# Patient Record
Sex: FEMALE | Race: WHITE | URBAN - METROPOLITAN AREA
[De-identification: names, ages, dates, MRNs, and addresses within clinical notes are randomized per-mention and may not be internally consistent; named-entity substitution may affect disease eponyms.]

---

## 2021-01-21 ENCOUNTER — OFFICE VISIT (OUTPATIENT)
Dept: URBAN - METROPOLITAN AREA CLINIC 72 | Facility: CLINIC | Age: 46
End: 2021-01-21
Payer: OTHER GOVERNMENT

## 2021-01-21 ENCOUNTER — DASHBOARD ENCOUNTERS (OUTPATIENT)
Age: 46
End: 2021-01-21

## 2021-01-21 ENCOUNTER — WEB ENCOUNTER (OUTPATIENT)
Dept: URBAN - METROPOLITAN AREA CLINIC 72 | Facility: CLINIC | Age: 46
End: 2021-01-21

## 2021-01-21 VITALS
WEIGHT: 161 LBS | HEIGHT: 70 IN | HEART RATE: 45 BPM | BODY MASS INDEX: 23.05 KG/M2 | DIASTOLIC BLOOD PRESSURE: 76 MMHG | TEMPERATURE: 97.8 F | SYSTOLIC BLOOD PRESSURE: 126 MMHG

## 2021-01-21 DIAGNOSIS — Z12.11 COLON CANCER SCREENING: ICD-10-CM

## 2021-01-21 DIAGNOSIS — K59.01 SLOW TRANSIT CONSTIPATION: ICD-10-CM

## 2021-01-21 PROBLEM — 35298007: Status: ACTIVE | Noted: 2021-01-21

## 2021-01-21 PROCEDURE — G8483 FLU IMM NO ADMIN DOC REA: HCPCS | Performed by: INTERNAL MEDICINE

## 2021-01-21 PROCEDURE — G8420 CALC BMI NORM PARAMETERS: HCPCS | Performed by: INTERNAL MEDICINE

## 2021-01-21 PROCEDURE — 99203 OFFICE O/P NEW LOW 30 MIN: CPT | Performed by: INTERNAL MEDICINE

## 2021-01-21 PROCEDURE — 1036F TOBACCO NON-USER: CPT | Performed by: INTERNAL MEDICINE

## 2021-01-21 PROCEDURE — G8427 DOCREV CUR MEDS BY ELIG CLIN: HCPCS | Performed by: INTERNAL MEDICINE

## 2021-01-21 RX ORDER — SACCHAROMYCES BOULARDII 250 MG
1 CAPSULE CAPSULE ORAL TWICE A DAY
Status: ACTIVE | COMMUNITY

## 2021-01-21 NOTE — HPI-TODAY'S VISIT:
Mrs. Hobbs is a pleasant 45-year-old female who presents for consultation for constipation and hemorrhoids.  She was referred by Gaby DE LEON and Chucky Acevedo M.D. Her past medical history is significant for uterine myoma with dysfunctional uterine bleeding and a history of endometrial ablation.  She is no family history of colorectal cancer.   Reports issues with constipation. she reports that she has changed her eating habits significantly, she used to eat salads every single day but had stopped doing so and began having constipation, she also notes that she was not consuming enough water.  She has since reintroduced these and started a probiotic which has been medically helped her.  She reports occasional issues with hemorrhoids denies any bleeding but does feel occasional perianal discomfort.  She would like to arrange her screening colonoscopy. Lab work 12/20/19 WBC 4.2 Hemoglobin 14.1 Hematocrit 43.4 Platelets 181 Sodium 142 Potassium 4.7 Chloride 105 Bicarbonate 24 Creatinine 1.6 BUN 11 Bili of 0.4 Alkaline phosphatase  44 AST 35 ALT 51

## 2022-01-27 ENCOUNTER — ESTABLISHED PATIENT (OUTPATIENT)
Dept: URBAN - METROPOLITAN AREA CLINIC 21 | Facility: CLINIC | Age: 47
End: 2022-01-27

## 2022-01-27 DIAGNOSIS — H52.4: ICD-10-CM

## 2022-01-27 PROCEDURE — 92014 COMPRE OPH EXAM EST PT 1/>: CPT

## 2022-01-27 PROCEDURE — 92015 DETERMINE REFRACTIVE STATE: CPT

## 2022-01-27 ASSESSMENT — KERATOMETRY
OS_AXISANGLE_DEGREES: 171
OD_AXISANGLE2_DEGREES: 51
OS_K1POWER_DIOPTERS: 43.75
OD_K1POWER_DIOPTERS: 43.50
OS_K2POWER_DIOPTERS: 44.00
OD_K2POWER_DIOPTERS: 44.00
OD_AXISANGLE_DEGREES: 141
OS_AXISANGLE2_DEGREES: 81

## 2022-01-27 ASSESSMENT — VISUAL ACUITY
OU_SC: J1
OS_SC: 20/20-1
OD_SC: 20/20-2

## 2022-01-27 ASSESSMENT — TONOMETRY
OD_IOP_MMHG: 10
OS_IOP_MMHG: 9

## 2024-07-12 ENCOUNTER — APPOINTMENT (OUTPATIENT)
Dept: WOMENS IMAGING | Facility: HOSPITAL | Age: 49
End: 2024-07-12
Payer: OTHER GOVERNMENT

## 2024-07-12 PROCEDURE — 77067 SCR MAMMO BI INCL CAD: CPT | Performed by: RADIOLOGY

## 2024-07-12 PROCEDURE — 77063 BREAST TOMOSYNTHESIS BI: CPT | Performed by: RADIOLOGY
